# Patient Record
Sex: FEMALE | Race: WHITE
[De-identification: names, ages, dates, MRNs, and addresses within clinical notes are randomized per-mention and may not be internally consistent; named-entity substitution may affect disease eponyms.]

---

## 2019-01-01 ENCOUNTER — HOSPITAL ENCOUNTER (EMERGENCY)
Dept: HOSPITAL 8 - ED | Age: 79
LOS: 1 days | Discharge: HOME | End: 2019-01-02
Payer: MEDICARE

## 2019-01-01 VITALS — HEIGHT: 68 IN | WEIGHT: 158.07 LBS | BODY MASS INDEX: 23.96 KG/M2
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
## 2019-01-01 DIAGNOSIS — R41.82: ICD-10-CM
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
## 2019-01-01 DIAGNOSIS — F10.120: Primary | ICD-10-CM
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
## 2019-01-01 PROCEDURE — 99284 EMERGENCY DEPT VISIT MOD MDM: CPT

## 2019-01-01 PROCEDURE — 85610 PROTHROMBIN TIME: CPT

## 2019-01-01 PROCEDURE — 80307 DRUG TEST PRSMV CHEM ANLYZR: CPT

## 2019-01-01 PROCEDURE — 81003 URINALYSIS AUTO W/O SCOPE: CPT

## 2019-01-01 PROCEDURE — 85025 COMPLETE CBC W/AUTO DIFF WBC: CPT

## 2019-01-01 PROCEDURE — G0480 DRUG TEST DEF 1-7 CLASSES: HCPCS

## 2019-01-01 PROCEDURE — 80053 COMPREHEN METABOLIC PANEL: CPT

## 2019-01-01 PROCEDURE — 93005 ELECTROCARDIOGRAM TRACING: CPT

## 2019-01-01 PROCEDURE — 71045 X-RAY EXAM CHEST 1 VIEW: CPT

## 2019-01-01 PROCEDURE — 80329 ANALGESICS NON-OPIOID 1 OR 2: CPT

## 2019-01-01 PROCEDURE — 36415 COLL VENOUS BLD VENIPUNCTURE: CPT

## 2019-01-01 PROCEDURE — 70450 CT HEAD/BRAIN W/O DYE: CPT

## 2019-01-01 PROCEDURE — 82140 ASSAY OF AMMONIA: CPT

## 2019-01-01 PROCEDURE — 72125 CT NECK SPINE W/O DYE: CPT

## 2019-01-01 NOTE — NUR
BIB [facility] PER PT'S ROOMATE PT HAD WITNESS MGLF, NO LOC, NO TRAUMA. ON EMS 
ARRIVAL PT WAS SCARTCHING , PT A&OX2, PT GIVEN 5 MG VERSED PTA. PT 
AND PT'S ROOM MATE WERE DRINKING WINE AT HOUSE AND BOTH POOR HISTORIAN, EMT 
FOUND ARICEPT MEDICATION AT PT'S RESIDENT. [id number], LEFT A/C IV SITE PLACED 
PTA. PROVIDED PT WITH WARM BLANKETS, MONITORS IN PLACE, SIDERAILS UP X2, CALL 
LIGHT WITHIN REACH, PT FREQUENTLY ASKING REPETITIVE QUESTIONS, REASSURED PT SHE 
AT THE HOSPITAL.

## 2019-01-02 VITALS — DIASTOLIC BLOOD PRESSURE: 72 MMHG | SYSTOLIC BLOOD PRESSURE: 128 MMHG

## 2019-01-02 LAB
ALBUMIN SERPL-MCNC: 3.4 G/DL (ref 3.4–5)
ALP SERPL-CCNC: 100 U/L (ref 45–117)
ALT SERPL-CCNC: 25 U/L (ref 12–78)
ANION GAP SERPL CALC-SCNC: 10 MMOL/L (ref 5–15)
APAP SERPL-MCNC: < 2 MCG/ML (ref 10–30)
BASOPHILS # BLD AUTO: 0.06 X10^3/UL (ref 0–0.1)
BASOPHILS NFR BLD AUTO: 1 % (ref 0–1)
BILIRUB SERPL-MCNC: 0.2 MG/DL (ref 0.2–1)
CALCIUM SERPL-MCNC: 8 MG/DL (ref 8.5–10.1)
CHLORIDE SERPL-SCNC: 104 MMOL/L (ref 98–107)
CREAT SERPL-MCNC: 0.6 MG/DL (ref 0.55–1.02)
CULTURE INDICATED?: NO
EOSINOPHIL # BLD AUTO: 0.09 X10^3/UL (ref 0–0.4)
EOSINOPHIL NFR BLD AUTO: 1 % (ref 1–7)
ERYTHROCYTE [DISTWIDTH] IN BLOOD BY AUTOMATED COUNT: 13.3 % (ref 9.6–15.2)
INR PPP: 1.05 (ref 0.93–1.1)
LYMPHOCYTES # BLD AUTO: 1.12 X10^3/UL (ref 1–3.4)
LYMPHOCYTES NFR BLD AUTO: 17 % (ref 22–44)
MCH RBC QN AUTO: 32.7 PG (ref 27–34.8)
MCHC RBC AUTO-ENTMCNC: 33.9 G/DL (ref 32.4–35.8)
MCV RBC AUTO: 96.4 FL (ref 80–100)
MD: NO
MICROSCOPIC: (no result)
MONOCYTES # BLD AUTO: 0.54 X10^3/UL (ref 0.2–0.8)
MONOCYTES NFR BLD AUTO: 8 % (ref 2–9)
NEUTROPHILS # BLD AUTO: 4.88 X10^3/UL (ref 1.8–6.8)
NEUTROPHILS NFR BLD AUTO: 73 % (ref 42–75)
PLATELET # BLD AUTO: 204 X10^3/UL (ref 130–400)
PMV BLD AUTO: 7.8 FL (ref 7.4–10.4)
PROT SERPL-MCNC: 6.2 G/DL (ref 6.4–8.2)
PROTHROMBIN TIME: 11.1 SECONDS (ref 9.6–11.5)
RBC # BLD AUTO: 3.93 X10^6/UL (ref 3.82–5.3)
VANCOMYCIN TROUGH SERPL-MCNC: < 1.7 MG/DL (ref 2.8–20)

## 2019-01-02 NOTE — NUR
PT OUT OF BED, STANDING IN ROOM. AMBULATORY TO RESTROOM WITH STAND BY 
ASSISTANCE. BACK TO BED WITHOUT DIFFICULTY. PT PROVIDED WITH CALL LIGHT AND 
INSTRUCTED HOW TO USE IT.

## 2019-01-02 NOTE — NUR
PT. TAKEN OFF OF BED PAN AND BEDDING CHANGED AS IT WAS SATURATED WITH URINE.  
PT. IS ABLE TO FOLLOW COMMANDS.  PT. ABLE TO DRINK WATER OUT OF THE CUP WITHOUT 
STRAW WITH NO DIFFICULTY.  PT. STATES "WHERE AM I?"  WHEN PT. TOLD SHE IS AT 
[facility] SHE STATES "OH NO, I HAVE NEVER HAD TO BE IN THE EMERGENCY ROOM 
BEFORE, DO YOU KNOW WHAT HAPPENED?"  PT. UPDATED ON HOW SHE GOT HERE; SPEECH IS 
NOW LESS SLURRED THAN BEFORE.  PT. STATES THAT SHE HAS NO MEDICAL PROBLEMS AND 
"I ONLY TAKE A MULTIVITAMIN AND DRINK A LOT OF WATER AND I'M PRETTY HEALTHY".  
PT. IS ON ALL MONITORS.  CALL LIGHT IN REACH; PT. EDUCATED TO USE CALL LIGHT 
FOR ASSISTANCE.  ALL SAFETY MEASURES OBSERVED.  REPORT TO QUINN PASCUAL TO ASSUME 
PT. CARE AT THIS TIME.

## 2019-01-02 NOTE — NUR
PT. MOVED TO ED 3 FOR SAFETY.  RECEIVED REPORT FROM QUINN TERRY TO ASSUME PT. 
CARE.  PT. STRAIGHT CAHTED PER DR. GUZMAN FOR DOA.  PT. A&O SELF ONLY.  PT. 
CONTINULLY ASKING "CAN I PLEASE HAVE SOME WATER".  PT. ABLE TO DRINK FROM A 
STRAW WITHOUT DIFFICULTY AFTER OK FROM DR. GUZMAN FOR PT. TO HAVE WATER.  PT. IS 
ABLE TO MOVE ALL EXTREMITIES EQUALLY AND DENIES DECREASED SENSATION.  REPORTS 
"I WAS DRINKING WINE, WHERE AM I?, WHAT HAPPENED?"  SLURRED SPEECH NOTED.  NO 
FACIAL DROOP.  PT. ABLE TO FOLLOW 1 STEP DIRECTIONS BUT CONTINUES TO REPEAT 
SELF FREQUENTLY.  PT. TO CT [facility]IA [facility] AT THIS TIME.

## 2019-01-02 NOTE — NUR
REPORT GIVEN FROM QUINN BROWN 

PT RESTING IN ROOM. VS STABLE. CALL LIGHT IN PLACE. WILL CONTINUE TO MONITOR.